# Patient Record
Sex: FEMALE | Race: WHITE
[De-identification: names, ages, dates, MRNs, and addresses within clinical notes are randomized per-mention and may not be internally consistent; named-entity substitution may affect disease eponyms.]

---

## 2018-04-27 ENCOUNTER — HOSPITAL ENCOUNTER (OUTPATIENT)
Dept: HOSPITAL 17 - CPRE | Age: 83
End: 2018-04-27
Attending: ORTHOPAEDIC SURGERY
Payer: COMMERCIAL

## 2018-04-27 DIAGNOSIS — M25.50: ICD-10-CM

## 2018-04-27 DIAGNOSIS — Z01.812: ICD-10-CM

## 2018-04-27 DIAGNOSIS — R94.31: ICD-10-CM

## 2018-04-27 DIAGNOSIS — M16.11: ICD-10-CM

## 2018-04-27 DIAGNOSIS — Z01.818: ICD-10-CM

## 2018-04-27 DIAGNOSIS — Z01.810: Primary | ICD-10-CM

## 2018-04-27 DIAGNOSIS — Z96.60: ICD-10-CM

## 2018-04-27 DIAGNOSIS — Z79.01: ICD-10-CM

## 2018-04-27 LAB
ALBUMIN SERPL-MCNC: 3.4 GM/DL (ref 3.4–5)
ALP SERPL-CCNC: 78 U/L (ref 45–117)
ALT SERPL-CCNC: 17 U/L (ref 10–53)
AST SERPL-CCNC: 16 U/L (ref 15–37)
BASOPHILS # BLD AUTO: 0 TH/MM3 (ref 0–0.2)
BASOPHILS NFR BLD: 0.5 % (ref 0–2)
BILIRUB SERPL-MCNC: 0.4 MG/DL (ref 0.2–1)
BUN SERPL-MCNC: 16 MG/DL (ref 7–18)
CALCIUM SERPL-MCNC: 8.9 MG/DL (ref 8.5–10.1)
CHLORIDE SERPL-SCNC: 105 MEQ/L (ref 98–107)
COLOR UR: YELLOW
CREAT SERPL-MCNC: 0.9 MG/DL (ref 0.5–1)
EOSINOPHIL # BLD: 0.1 TH/MM3 (ref 0–0.4)
EOSINOPHIL NFR BLD: 1.3 % (ref 0–4)
ERYTHROCYTE [DISTWIDTH] IN BLOOD BY AUTOMATED COUNT: 14 % (ref 11.6–17.2)
ERYTHROCYTE [SEDIMENTATION RATE] IN BLOOD BY WESTERGREN METHOD: 10 MM/HR (ref 0–30)
GFR SERPLBLD BASED ON 1.73 SQ M-ARVRAT: 60 ML/MIN (ref 89–?)
GLUCOSE UR STRIP-MCNC: (no result) MG/DL
HCO3 BLD-SCNC: 31.9 MEQ/L (ref 21–32)
HCT VFR BLD CALC: 40 % (ref 35–46)
HGB BLD-MCNC: 13.5 GM/DL (ref 11.6–15.3)
HGB UR QL STRIP: (no result)
INR PPP: 1 RATIO
KETONES UR STRIP-MCNC: (no result) MG/DL
LYMPHOCYTES # BLD AUTO: 1.6 TH/MM3 (ref 1–4.8)
LYMPHOCYTES NFR BLD AUTO: 26.7 % (ref 9–44)
MCH RBC QN AUTO: 31.8 PG (ref 27–34)
MCHC RBC AUTO-ENTMCNC: 33.8 % (ref 32–36)
MCV RBC AUTO: 94.1 FL (ref 80–100)
MONOCYTE #: 0.5 TH/MM3 (ref 0–0.9)
MONOCYTES NFR BLD: 7.5 % (ref 0–8)
MUCOUS THREADS #/AREA URNS LPF: (no result) /LPF
NEUTROPHILS # BLD AUTO: 3.9 TH/MM3 (ref 1.8–7.7)
NEUTROPHILS NFR BLD AUTO: 64 % (ref 16–70)
NITRITE UR QL STRIP: (no result)
PLATELET # BLD: 209 TH/MM3 (ref 150–450)
PMV BLD AUTO: 8.8 FL (ref 7–11)
PROT SERPL-MCNC: 7.2 GM/DL (ref 6.4–8.2)
PROTHROMBIN TIME: 10.3 SEC (ref 9.8–11.6)
RBC # BLD AUTO: 4.25 MIL/MM3 (ref 4–5.3)
SODIUM SERPL-SCNC: 143 MEQ/L (ref 136–145)
SP GR UR STRIP: 1.02 (ref 1–1.03)
SQUAMOUS #/AREA URNS HPF: 1 /HPF (ref 0–5)
URINE LEUKOCYTE ESTERASE: (no result)
WBC # BLD AUTO: 6.1 TH/MM3 (ref 4–11)

## 2018-04-27 PROCEDURE — 80053 COMPREHEN METABOLIC PANEL: CPT

## 2018-04-27 PROCEDURE — 85652 RBC SED RATE AUTOMATED: CPT

## 2018-04-27 PROCEDURE — 85610 PROTHROMBIN TIME: CPT

## 2018-04-27 PROCEDURE — 85025 COMPLETE CBC W/AUTO DIFF WBC: CPT

## 2018-04-27 PROCEDURE — 93005 ELECTROCARDIOGRAM TRACING: CPT

## 2018-04-27 PROCEDURE — 85730 THROMBOPLASTIN TIME PARTIAL: CPT

## 2018-04-27 PROCEDURE — 36415 COLL VENOUS BLD VENIPUNCTURE: CPT

## 2018-04-27 PROCEDURE — 81001 URINALYSIS AUTO W/SCOPE: CPT

## 2018-04-27 PROCEDURE — 71046 X-RAY EXAM CHEST 2 VIEWS: CPT

## 2018-04-27 NOTE — EKG
Date Performed: 04/27/2018       Time Performed: 09:36:35

 

PTAGE:      83 years

 

EKG:      Sinus rhythm 

 

 MODERATE ST DEPRESSION ABNORMAL ECG 

 

NO PREVIOUS TRACING            

 

DOCTOR:   Remi Kimball  Interpretating Date/Time  04/27/2018 13:34:39

## 2018-04-27 NOTE — RADRPT
EXAM DATE/TIME:  04/27/2018 11:21 

 

[facility] COMPARISON:     

No previous studies available for comparison.

 

                     

INDICATIONS :     

Pre op right hip surgery.

                     

 

MEDICAL HISTORY :     

None.          

 

SURGICAL HISTORY :     

None.   

 

ENCOUNTER:     

Initial                                        

 

ACUITY:     

1 day      

 

PAIN SCORE:     

0/10

 

LOCATION:     

Bilateral chest 

 

FINDINGS:     

PA and lateral views of the chest demonstrate the lungs to be symmetrically aerated without evidence 
of mass, infiltrate or effusion.  The cardiomediastinal contours are unremarkable. Degenerative garcia
es are noted throughout the thoracic spine.

 

CONCLUSION:     

1. No acute cardiopulmonary disease. 

2. Degenerative changes throughout the thoracic spine. 

 

 

 Derek Tracy MD on April 27, 2018 at 11:42           

Board Certified Radiologist.

 This report was verified electronically.

## 2018-05-14 ENCOUNTER — HOSPITAL ENCOUNTER (INPATIENT)
Dept: HOSPITAL 17 - HSDI | Age: 83
LOS: 1 days | Discharge: HOME HEALTH | DRG: 470 | End: 2018-05-15
Attending: ORTHOPAEDIC SURGERY | Admitting: ORTHOPAEDIC SURGERY
Payer: MEDICARE

## 2018-05-14 VITALS
SYSTOLIC BLOOD PRESSURE: 105 MMHG | RESPIRATION RATE: 18 BRPM | TEMPERATURE: 96.7 F | OXYGEN SATURATION: 98 % | DIASTOLIC BLOOD PRESSURE: 52 MMHG | HEART RATE: 80 BPM

## 2018-05-14 VITALS
HEART RATE: 71 BPM | DIASTOLIC BLOOD PRESSURE: 57 MMHG | OXYGEN SATURATION: 99 % | SYSTOLIC BLOOD PRESSURE: 114 MMHG | RESPIRATION RATE: 16 BRPM | TEMPERATURE: 98.2 F

## 2018-05-14 VITALS
TEMPERATURE: 97.6 F | OXYGEN SATURATION: 98 % | DIASTOLIC BLOOD PRESSURE: 59 MMHG | RESPIRATION RATE: 17 BRPM | SYSTOLIC BLOOD PRESSURE: 110 MMHG | HEART RATE: 55 BPM

## 2018-05-14 VITALS
RESPIRATION RATE: 17 BRPM | HEART RATE: 54 BPM | SYSTOLIC BLOOD PRESSURE: 118 MMHG | DIASTOLIC BLOOD PRESSURE: 54 MMHG | TEMPERATURE: 97.3 F | OXYGEN SATURATION: 99 %

## 2018-05-14 VITALS — HEIGHT: 66 IN | BODY MASS INDEX: 25.79 KG/M2 | WEIGHT: 160.5 LBS

## 2018-05-14 DIAGNOSIS — E78.5: ICD-10-CM

## 2018-05-14 DIAGNOSIS — M24.851: ICD-10-CM

## 2018-05-14 DIAGNOSIS — E11.9: ICD-10-CM

## 2018-05-14 DIAGNOSIS — Z79.84: ICD-10-CM

## 2018-05-14 DIAGNOSIS — M25.751: ICD-10-CM

## 2018-05-14 DIAGNOSIS — M16.11: Primary | ICD-10-CM

## 2018-05-14 PROCEDURE — 85027 COMPLETE CBC AUTOMATED: CPT

## 2018-05-14 PROCEDURE — 86850 RBC ANTIBODY SCREEN: CPT

## 2018-05-14 PROCEDURE — 0SR902A REPLACEMENT OF RIGHT HIP JOINT WITH METAL ON POLYETHYLENE SYNTHETIC SUBSTITUTE, UNCEMENTED, OPEN APPROACH: ICD-10-PCS | Performed by: ORTHOPAEDIC SURGERY

## 2018-05-14 PROCEDURE — 86901 BLOOD TYPING SEROLOGIC RH(D): CPT

## 2018-05-14 PROCEDURE — 82948 REAGENT STRIP/BLOOD GLUCOSE: CPT

## 2018-05-14 PROCEDURE — 94150 VITAL CAPACITY TEST: CPT

## 2018-05-14 PROCEDURE — 86900 BLOOD TYPING SEROLOGIC ABO: CPT

## 2018-05-14 PROCEDURE — 80048 BASIC METABOLIC PNL TOTAL CA: CPT

## 2018-05-14 PROCEDURE — 76000 FLUOROSCOPY <1 HR PHYS/QHP: CPT

## 2018-05-14 PROCEDURE — C1776 JOINT DEVICE (IMPLANTABLE): HCPCS

## 2018-05-14 PROCEDURE — 73502 X-RAY EXAM HIP UNI 2-3 VIEWS: CPT

## 2018-05-14 RX ADMIN — PHENYTOIN SODIUM SCH MLS/HR: 50 INJECTION INTRAMUSCULAR; INTRAVENOUS at 11:50

## 2018-05-14 RX ADMIN — PHENYTOIN SODIUM SCH MLS/HR: 50 INJECTION INTRAMUSCULAR; INTRAVENOUS at 16:14

## 2018-05-14 RX ADMIN — ONDANSETRON PRN MG: 2 INJECTION, SOLUTION INTRAMUSCULAR; INTRAVENOUS at 22:27

## 2018-05-14 RX ADMIN — INSULIN ASPART SCH: 100 INJECTION, SOLUTION INTRAVENOUS; SUBCUTANEOUS at 20:21

## 2018-05-14 RX ADMIN — ONDANSETRON PRN MG: 2 INJECTION, SOLUTION INTRAMUSCULAR; INTRAVENOUS at 13:29

## 2018-05-14 RX ADMIN — HYDROCODONE BITARTRATE AND ACETAMINOPHEN PRN TAB: 10; 325 TABLET ORAL at 22:27

## 2018-05-14 RX ADMIN — INSULIN ASPART SCH: 100 INJECTION, SOLUTION INTRAVENOUS; SUBCUTANEOUS at 13:29

## 2018-05-14 RX ADMIN — INSULIN ASPART SCH: 100 INJECTION, SOLUTION INTRAVENOUS; SUBCUTANEOUS at 16:14

## 2018-05-14 NOTE — HHI.FF
Face to Face Verification


Diagnosis:  


(1) Primary localized osteoarthrosis, pelvic region and thigh


Physical Therapy


Gait training, Safety evaluation, Transfer training, bed to chair


Hip:  Total hip, Protocol: Right


Right LE Weight Bearing:  WB as tolerated





Nursing


Nursing:  Dressing changes


Dressing Changes:  Daily dressing change











I have seen patient Evita Jorge on 5/14/18. My clinical findings 

support the need for the requested home health care services because:








 Limited ability to care for self





 High risk of falls














I certify that my clinical findings support that this patient is homebound 

because:








 Post-op weakness





 Unsteady gait/balance

















Morales Egan May 14, 2018 13:09

## 2018-05-14 NOTE — MP
cc:

Morales José MD

****

 

 

DATE OF OPERATION:

05/14/2018

 

PREOPERATIVE DIAGNOSIS:

Right hip osteoarthritis.

 

POSTOPERATIVE DIAGNOSIS:

Right hip osteoarthritis.

 

PROCEDURE PERFORMED:

Right total hip arthroplasty.

 

SURGEON:

Morales José MD.

 

FIRST ASSISTANT:

MADHAVI Presley.

 

ANESTHESIA:

General.

 

ESTIMATED BLOOD LOSS:

200 mL.

 

COMPLICATIONS:

None.

 

IMPLANTS USED:

DePuy Corail size 12 press-fit standard offset femoral stem, size 52 

Ransom Gription cup, 36 mm highly cross-linked polyethylene neutral 

liner, 36 mm cobalt chrome head, +1.5 neck.

 

JUSTIFICATION:

This patient is an 83-year-old female with a history of severe 

osteoarthritis involving the right hip.  She has severe disabling pain

with standing, walking, ambulation and weight-bearing activities.  She

has severe pain at rest.  She has failed greater than 3 months of 

nonoperative conservative treatment to include medication therapy, 

injections, ambulatory assistive aids, home exercise program, activity

modification, and weight loss.  X-ray of the right hip reveal severe 

osteoarthrosis, bone-on-bone joint space narrowing, subchondral 

sclerosis, subchondral cyst osteophyte formation with subluxation.  

The patient was counseled as to the risks, benefits and alternatives 

to a total hip arthroplasty.  The risks were discussed, which include,

but are not limited to anesthesia, bleeding, infection, damage to 

nerves or blood vessels, pain, stiffness, fracture dislocations, leg 

length discrepancy, blood clots, and even death.  The patient's pain 

is severe.  She favored the benefits over the risks, she did wish to 

proceed with surgery.

 

PROCEDURE IN DETAIL:

Written consent was obtained.  The patient was identified by name, 

taken to the operating room, placed supine on the operating table.  

General anesthesia was administered, as well as 2 grams of IV Ancef, 

and 1 gram of IV vancomycin.  The right hip and right lower extremity 

were prepped and draped using Isuprel alcohol, Hibiclens solution and 

ChloraPrep solution.  After a time-out was performed, a longitudinal 

incision was made over the anterolateral aspect of the right hip.  The

fascial layer was incised.  Dissection was carried over the tensor 

fascia fernanda beneath the rectus femoris to allow exposure of the 

anterior hip capsulotomy.  A capsulotomy incision was performed.  An 

oscillating saw was used to perform a femoral neck cut, the 

osteoarthritic femoral head and neck component was removed.  A #10 

blade scalpel was used to excise the labrum.  Sequential reaming began

at size 49 and was carried through to a size 52.  Subsequently, a 

Ransom Gription cup size 52 was then placed in approximately 45 

degrees of abduction and 10 degrees of anteversion.  There was 

excellent purchase and fixation of the insertion of the cup of the 

cup.  A screw hole eliminator was placed, followed by the neutral 

polyethylene highly cross-linked 36 mm liner.  The liner was impacted 

in place and tested for stability.  Attention was turned to the femur 

where the leg was externally rotated, extended and adducted.  The 

capsule was released off the undersurface of the greater trochanter to

allow for elevation and lateralization of the femur.  A box cutting 

osteotome was used to gain entry into the intramedullary canal of the 

femur.  This was followed by canal finder, sequential broaching up to 

size 12.  A calcar planer was used to plane the calcar.  Trial head 

and neck combinations were evaluated and final components implanted 

with the current components.  The leg could achieve external rotation 

to 70 degrees and extension all the way down to the ground without 

evidence of anterior instability or impingement.  Fluoroscopic imaging

showed appropriate implantation of components and soft tissue tension 

felt appropriate.

 

The surgical wound was thoroughly irrigated with sterile saline, pulse

lavage antibiotic impregnated solution.  The fascial layer was closed 

with #1 Vicryl suture, subcuticular layer 2-0 Vicryl suture, skin was 

closed with Dermabond.  Sterile dressing applied.  The patient 

tolerated the procedure with no intraoperative complications noted.

 

Julio Egan, Physician Assistant, Certified, was present during the 

entire procedure to include patient positioning and the procedure 

itself.  The medical necessity of a physician assistant was indicated 

in this case due to the complexity of the procedure, he assisted with 

appropriate manipulation of the leg and also retraction of muscles, 

tendon, bone and neurovascular structures.  He assisted with 

appropriate manipulation of bone and also implantation of the 

prosthetic replacement.

 

 

 

 

__________________________________

MD KRISTA Kelly/ASH

D: 05/14/2018, 11:18 AM

T: 05/14/2018, 11:56 AM

Visit #: [id number]

Job #: [id number]

## 2018-05-14 NOTE — HHI.DCPOC
Discharge Care Plan


Diagnosis:  


(1) Primary localized osteoarthrosis, pelvic region and thigh








Your Health Problems Are: Difficulty with ADL








Goals to Promote Your Health


* To prevent worsening of your condition and complications


* To maintain your health at the optimal level


Directions to Meet Your Goals


*** Take your medications as prescribed


*** Follow your dietary instruction


*** Follow activity as directed








*** Keep your appointments as scheduled


*** Take your immunizations and boosters as scheduled


*** If your symptoms worsen call your PCP, if no PCP go to Urgent Care Center 

or Emergency Room***


*** Smoking is Dangerous to Your Health. Avoid second hand smoke***


***Call the 24-hour hour crisis hotline for domestic abuse at 1-529.164.7512***











Morales Egan May 14, 2018 13:08

## 2018-05-14 NOTE — RADRPT
EXAM DATE/TIME:  05/14/2018 23:44 

 

[facility] COMPARISON:     

No previous studies available for comparison.

 

                     

INDICATIONS :     

Post op right hip

                     

 

MEDICAL HISTORY :     

None.          

 

SURGICAL HISTORY :        

total right hip

 

ENCOUNTER:     

Initial                                        

 

ACUITY:     

1 day      

 

PAIN SCORE:     

Non-responsive.

 

LOCATION:     

Right  hip

 

FINDINGS:     

Examination of the right hip was performed with AP Pelvis. Postoperative right hip. Postsurgical valdes
ges. Moderate degenerative changes left hip. Osteitis pubis.

 

CONCLUSION:     Postoperative right hip

 

 

 

 Godwin Conley MD on May 14, 2018 at 12:16           

Board Certified Radiologist.

 This report was verified electronically.

## 2018-05-14 NOTE — RADRPT
EXAM DATE/TIME:  05/14/2018 10:11 

 

[facility] COMPARISON:     

HIP RIGHT (AP&LAT 2/3VWS) W AP PELVIS, May 14, 2018, 23:44.

 

                     

INDICATIONS :     

Right total hip replacement. Per Dr. José, images adequate for doctor.

                     

 

MEDICAL HISTORY :     

None.          

 

SURGICAL HISTORY :     

None.   

 

ENCOUNTER:     

Initial                                        

 

ACUITY:     

1 day      

 

PAIN SCORE:     

Non-responsive.

 

LOCATION:     

Right  Hip

 

 

CONCLUSION:     

Fluoroscopic image during right hip arthroplasty.

 

 

 

 Godwin Conley MD on May 14, 2018 at 12:25           

Board Certified Radiologist.

 This report was verified electronically.

## 2018-05-14 NOTE — PD.CONS
HPI


Service


[facility]


Consult Requested By





Reason for Consult


medical management


Primary Care Physician


Verónica Chahal DO


Diagnoses:  


History of Present Illness


patient is a 84 y/o female with history of osteoarthritis, diabetes mellitus 

and dyslipidemia who's been admitted for right total hip arthroplasty. at the 

time of my evaluation she was resting comfortably with no distress, pain or any 

other complaints.





Review of Systems


Constitutional:  DENIES: Fever, Weight loss, Chills, Night Sweats


Eyes:  DENIES: Blurred vision, Diplopia, Vision loss, Double Vision


Ears, nose, mouth, throat:  DENIES: Tinnitus, Vertigo, Throat pain, Epistaxis


Respiratory:  DENIES: Apneas, Cough, Snoring, Wheezing, Hemoptysis, Sputum 

production, Shortness of breath


Cardiovascular:  DENIES: Chest pain, Palpitations, Syncope, Dyspnea on Exertion

, PND, Lower Extremity Edema, Orthopnea, Claudication


Gastrointestinal:  DENIES: Abdominal pain, Black stools, Bloody stools, 

Constipation, Diarrhea, Nausea, Vomiting, Difficulty Swallowing, Anorexia


Genitourinary:  DENIES: Urinary frequency, Urgency, Hematuria, Dysuria


Musculoskeletal:  DENIES: Joint pain, Muscle aches, Stiffness, Joint Swelling


Integumentary:  DENIES: Rash


Neurologic:  DENIES: Abnormal gait, Headache, Localized weakness, Paresthesias, 

Seizures, Speech Problems, Tremor, Poor Balance


Psychiatric:  DENIES: Anxiety, Confusion, Mood changes, Depression, 

Hallucinations, Agitation, Suicidal Ideation, Homicidal Ideation, Delusions





Past Family Social History


Allergies:  


Coded Allergies:  


     No Known Allergies (Unverified , 4/27/18)


Past Medical History


diabetes mellitus/ dyslipidemia/osteoarthritis


Past Surgical History


tonsillectomy/ knee replacement/ hysterectomy


Reported Medications


actos/Onglyza/pravastatin/aspirin/ cholecalciferol/protonix/norco


Active Ordered Medications





Inpatient Medications


Acetaminophen/ Hydrocodone Bitart (Norco  Mg) 2 tab Q6H  PRN PO PAIN 

SCALE 5 TO 10;  Start 5/14/18 at 07:00


Bupivacaine Liposome 20 ml/ Sodium Chloride 60 ml @  120 mls/hr ONCE P-ARTICULR 

;  Start 5/14/18 at 07:45;  Stop 5/15/18 at 07:44


Cefazolin Sodium 1000 mg/Sodium Chloride 100 ml @  200 mls/hr Q6H IV ;  Start 5/ 14/18 at 07:00;  Stop 5/14/18 at 19:29;  Status UNV


Cefazolin Sodium/ Dextrose 50 ml @  100 mls/hr ON  CALL IV ;  Start 5/14/18 at 

07:45;  Stop 5/17/18 at 07:44


Chlorhexidine Gluconate (Chlorhexidine 2% Cloth) 3 pack ON CALL  PRN TOPICAL 

SEE LABEL COMMENTS;  Start 5/14/18 at 07:45;  Stop 5/17/18 at 07:44


Chlorhexidine Gluconate (Hibiclens 4% Top Soln) 1 applic ONCE TOPICAL ;  Start 5 /14/18 at 07:45;  Stop 5/17/18 at 07:44


Dexamethasone Sodium Phosphate (Decadron Inj) 10 mg ONCE  ONCE IV PUSH ;  Start 

5/14/18 at 07:45;  Stop 5/14/18 at 07:46;  Status DC


Diphenhydramine HCl (Benadryl Inj) 25 mg Q6H  PRN IV PUSH ITCHING;  Start 5/14/ 18 at 07:00;  Status UNV


Docusate Sodium (Colace) 100 mg BID PO ;  Start 5/15/18 at 21:00


Enoxaparin Sodium (Lovenox Inj) 40 mg Q24H SQ ;  Start 5/14/18 at 07:00;  Stop 5 /23/18 at 07:01;  Status UNV


Lactated Ringer's 1,000 ml @  30 mls/hr Q24H PRN IV SEE LABEL COMMENTS;  Start 5 /14/18 at 07:45;  Stop 5/17/18 at 07:44


Metoprolol Tartrate (Lopressor) 25 mg ON CALL  PRN PO SEE LABEL COMMENTS;  

Start 5/14/18 at 07:45;  Stop 5/17/18 at 07:44


Miscellaneous Information (Misc Post-op Orders (for Pharmacy))  STAT  ONCE XX ;

  Start 5/14/18 at 07:00;  Stop 5/14/18 at 07:01;  Status UNV


Morphine Sulfate (Morphine Inj) 3 mg Q3H  PRN IV PUSH Pain >7 when off PCA;  

Start 5/14/18 at 07:00


Multivitamins/ Minerals Therapeutic (Theragran M Tab) 1 tab BID PO ;  Start 5/15

/18 at 21:00;  Stop 7/14/18 at 20:59


Non-Formulary Medication 5 mg DAILY PO ;  Start 5/14/18 at 09:00;  Status UNV


Ondansetron HCl (Zofran Inj) 4 mg Q6H  PRN IVP NAUSEA OR VOMITING;  Start 5/14/ 18 at 07:00


Pantoprazole Sodium (Protonix) 40 mg DAILY PO ;  Start 5/14/18 at 09:00


Pioglitazone HCl (Actos) 15 mg DAILY PO ;  Start 5/14/18 at 09:00


Povidone Iodine (Betadine 5% Antisepsis Kit) 1 applic ON CALL  PRN EACH NARE 

SEE LABEL COMMENTS;  Start 5/14/18 at 07:45;  Stop 5/17/18 at 07:44


Povidone Iodine (Betadine 7.5% Scrub) 1 applic ONCE TOPICAL ;  Start 5/14/18 at 

07:45;  Stop 5/17/18 at 07:44


Pravastatin Sodium (Pravachol) 20 mg DAILY PO ;  Start 5/14/18 at 09:00


Sodium Chloride 500 ml @  30 mls/hr [id number] PRN IV SEE LABEL COMMENTS;  Start 5/ 14/18 at 07:45;  Stop 5/17/18 at 07:44


Tranexamic Acid 1092 mg/Sodium Chloride 110.92 ml  @ 200 mls/ hr ONCE IV ;  

Start 5/14/18 at 07:45;  Stop 5/15/18 at 07:44


Tranexamic Acid 3000 mg/Sodium Chloride 130 ml @  260 mls/hr ONCE P-ARTICULR ;  

Start 5/14/18 at 07:45;  Stop 5/15/18 at 07:44


Vancomycin HCl 1000 mg/Sodium Chloride 250 ml @  250 mls/hr ON  CALL IV ;  

Start 5/14/18 at 07:45;  Stop 5/17/18 at 07:44


Zolpidem Tartrate (Ambien) 5 mg HS  PRN PO SLEEP;  Start 5/14/18 at 07:00


Family History


not relevant to this consult.


Social History


doesn't smoke/ drinks occasionally.





Physical Exam


Vital Signs





Vital Signs








  Date Time  Temp Pulse Resp B/P (MAP) Pulse Ox O2 Delivery O2 Flow Rate FiO2


 


5/14/18 07:50 97.8 65 20 143/68 (93) 96   








Physical Exam


GENERAL: This is a well-nourished, well-developed patient, in no apparent 

distress.


SKIN: No rashes, ecchymoses or lesions. Cool and dry.


HEAD: Atraumatic. Normocephalic. No temporal or scalp tenderness.


EYES: Pupils equal round and reactive. Extraocular motions intact. No scleral 

icterus. No injection or drainage. 


ENT: Nose without bleeding, purulent drainage or septal hematoma. Throat 

without erythema, tonsillar hypertrophy or exudate. Uvula midline. Airway 

patent.


NECK: Trachea midline. No JVD or lymphadenopathy. Supple, nontender, no 

meningeal signs.


CARDIOVASCULAR: Regular rate and rhythm without murmurs, gallops, or rubs. 


RESPIRATORY: Clear to auscultation. Breath sounds equal bilaterally. No wheezes

, rales, or rhonchi.  


GASTROINTESTINAL: Abdomen soft, non-tender, nondistended. No hepato-splenomegaly

, or palpable masses. No guarding.


MUSCULOSKELETAL: Extremities without clubbing, cyanosis, or edema. No joint 

tenderness, effusion, or edema noted. No calf tenderness. Negative Homans sign 

bilaterally.


NEUROLOGICAL: Awake and alert. Cranial nerves II through XII intact.  Motor and 

sensory grossly within normal limits. Five out of 5 muscle strength in all 

muscle groups.  Normal speech.





Assessment and Plan


Assessment and Plan


A/P  





- osteoarthritis- plan for right total hip arthroplasty


  management per ortho.


-diabetes mellitus; resumed home meds- accu-check with SSI


-dyslipidemia; resumed statin


-DVT prophylaxis- per ortho








thank you for the consult.


Discussed Condition With


the patient.











Soni Mccrary MD May 14, 2018 08:27

## 2018-05-15 VITALS
SYSTOLIC BLOOD PRESSURE: 101 MMHG | RESPIRATION RATE: 16 BRPM | HEART RATE: 60 BPM | DIASTOLIC BLOOD PRESSURE: 53 MMHG | TEMPERATURE: 97.5 F | OXYGEN SATURATION: 99 %

## 2018-05-15 VITALS
RESPIRATION RATE: 17 BRPM | OXYGEN SATURATION: 97 % | SYSTOLIC BLOOD PRESSURE: 92 MMHG | DIASTOLIC BLOOD PRESSURE: 46 MMHG | HEART RATE: 61 BPM | TEMPERATURE: 97.5 F

## 2018-05-15 VITALS — DIASTOLIC BLOOD PRESSURE: 58 MMHG | SYSTOLIC BLOOD PRESSURE: 107 MMHG

## 2018-05-15 VITALS
DIASTOLIC BLOOD PRESSURE: 52 MMHG | SYSTOLIC BLOOD PRESSURE: 94 MMHG | HEART RATE: 62 BPM | RESPIRATION RATE: 16 BRPM | TEMPERATURE: 97.4 F | OXYGEN SATURATION: 99 %

## 2018-05-15 LAB
BUN SERPL-MCNC: 13 MG/DL (ref 7–18)
CALCIUM SERPL-MCNC: 7.9 MG/DL (ref 8.5–10.1)
CHLORIDE SERPL-SCNC: 107 MEQ/L (ref 98–107)
CREAT SERPL-MCNC: 0.73 MG/DL (ref 0.5–1)
ERYTHROCYTE [DISTWIDTH] IN BLOOD BY AUTOMATED COUNT: 14 % (ref 11.6–17.2)
GFR SERPLBLD BASED ON 1.73 SQ M-ARVRAT: 77 ML/MIN (ref 89–?)
GLUCOSE SERPL-MCNC: 153 MG/DL (ref 74–106)
HCO3 BLD-SCNC: 27.4 MEQ/L (ref 21–32)
HCT VFR BLD CALC: 32.6 % (ref 35–46)
HGB BLD-MCNC: 10.9 GM/DL (ref 11.6–15.3)
MCH RBC QN AUTO: 31.4 PG (ref 27–34)
MCHC RBC AUTO-ENTMCNC: 33.5 % (ref 32–36)
MCV RBC AUTO: 93.7 FL (ref 80–100)
PLATELET # BLD: 181 TH/MM3 (ref 150–450)
PMV BLD AUTO: 8.9 FL (ref 7–11)
RBC # BLD AUTO: 3.48 MIL/MM3 (ref 4–5.3)
SODIUM SERPL-SCNC: 143 MEQ/L (ref 136–145)
WBC # BLD AUTO: 13.2 TH/MM3 (ref 4–11)

## 2018-05-15 RX ADMIN — PHENYTOIN SODIUM SCH MLS/HR: 50 INJECTION INTRAMUSCULAR; INTRAVENOUS at 13:00

## 2018-05-15 RX ADMIN — INSULIN ASPART SCH: 100 INJECTION, SOLUTION INTRAVENOUS; SUBCUTANEOUS at 11:47

## 2018-05-15 RX ADMIN — HYDROCODONE BITARTRATE AND ACETAMINOPHEN PRN TAB: 10; 325 TABLET ORAL at 12:28

## 2018-05-15 RX ADMIN — INSULIN ASPART SCH: 100 INJECTION, SOLUTION INTRAVENOUS; SUBCUTANEOUS at 08:00

## 2018-05-15 RX ADMIN — HYDROCODONE BITARTRATE AND ACETAMINOPHEN PRN TAB: 10; 325 TABLET ORAL at 08:07

## 2018-05-15 RX ADMIN — PHENYTOIN SODIUM SCH MLS/HR: 50 INJECTION INTRAMUSCULAR; INTRAVENOUS at 03:00

## 2018-05-15 NOTE — HHI.PR
Subjective


Remarks


Patient sitting up in chair, just finished physical therapy. Denies any pain. 

No chest pain or SOB





Objective


Vitals





Vital Signs








  Date Time  Temp Pulse Resp B/P (MAP) Pulse Ox O2 Delivery O2 Flow Rate FiO2


 


5/15/18 08:00 97.5 60 16 101/53 (69) 99   


 


5/15/18 05:16    107/58 (74)    


 


5/15/18 04:30 97.5 61 17 92/46 (61) 97   


 


5/14/18 22:53 96.7 80 18 105/52 (69) 98   


 


5/14/18 19:41 97.6 55 17 110/59 (76) 98   


 


5/14/18 16:21 98.2 71 16 114/57 (76) 99   


 


5/14/18 13:05 97.3 54 17 118/54 (75) 99   


 


5/14/18 12:47 98.5 55 17 121/57 (78) 99 Nasal Cannula 3 


 


5/14/18 12:30  54 17 120/58 (78) 99 Nasal Cannula 3 


 


5/14/18 12:15  54 16 134/58 (83) 99 Nasal Cannula 3 


 


5/14/18 12:00  59 17 111/54 (73) 99 Nasal Cannula 3 


 


5/14/18 11:45  69 17 118/58 (78) 99 Nasal Cannula 3 


 


5/14/18 11:30 97.5 94 17 146/67 (93) 99 Nasal Cannula 3 














I/O      


 


 5/14/18 5/14/18 5/14/18 5/15/18 5/15/18 5/15/18





 06:59 14:59 22:59 06:59 14:59 22:59


 


Intake Total  1000 ml 480 ml 680 ml  


 


Output Total  200 ml 100 ml   


 


Balance  800 ml 380 ml 680 ml  


 


      


 


Intake Oral   480 ml 480 ml  


 


IV Total    200 ml  


 


Other  1000 ml    


 


Output Emesis   100 ml   


 


Estimated Blood Loss  200 ml    


 


# Voids   2 3  


 


# Bowel Movements   0 1  








Result Diagram:  


5/15/18 0415                                                                   

             5/15/18 0515





Objective Remarks





SKIN: Warm and dry. dressing dry and intact


HEAD: Atraumatic. Normocephalic. 


EYES: Pupils equal and round.


CARDIOVASCULAR: Regular rate and rhythm.  


RESPIRATORY: No accessory muscle use. Clear to auscultation. Breath sounds 

equal bilaterally. 


GASTROINTESTINAL: Abdomen soft, non-tender, nondistended. Hepatic and splenic 

margins not palpable. 


MUSCULOSKELETAL: Extremities without clubbing, cyanosis, or edema. No obvious 

deformities. 


NEUROLOGICAL: Awake and alert.  Normal speech.


PSYCHIATRIC: Appropriate mood and affect; insight and judgment normal.





A/P


Assessment and Plan


osteoarthritis- s/p  right total hip arthroplasty


  management per ortho.





diabetes mellitus; resumed home meds- accu-check with SSI





dyslipidemia: Cont statin





DVT prophylaxis- per ortho





Hospitalist clear for discharge


Discharge Planning


per ortho











Alecia Rocha May 15, 2018 11:18

## 2018-05-15 NOTE — PD.ORT.PN
Subjective


Post Op Day #:  1


Subjective Remarks


pain under control.





Objective


Vitals





Vital Signs








  Date Time  Temp Pulse Resp B/P (MAP) Pulse Ox O2 Delivery O2 Flow Rate FiO2


 


5/15/18 05:16    107/58 (74)    


 


5/15/18 04:30 97.5 61 17 92/46 (61) 97   


 


5/14/18 22:53 96.7 80 18 105/52 (69) 98   


 


5/14/18 19:41 97.6 55 17 110/59 (76) 98   


 


5/14/18 16:21 98.2 71 16 114/57 (76) 99   


 


5/14/18 13:05 97.3 54 17 118/54 (75) 99   


 


5/14/18 12:47 98.5 55 17 121/57 (78) 99 Nasal Cannula 3 


 


5/14/18 12:30  54 17 120/58 (78) 99 Nasal Cannula 3 


 


5/14/18 12:15  54 16 134/58 (83) 99 Nasal Cannula 3 


 


5/14/18 12:00  59 17 111/54 (73) 99 Nasal Cannula 3 


 


5/14/18 11:45  69 17 118/58 (78) 99 Nasal Cannula 3 


 


5/14/18 11:30 97.5 94 17 146/67 (93) 99 Nasal Cannula 3 














I/O      


 


 5/14/18 5/14/18 5/14/18 5/15/18 5/15/18 5/15/18





 07:00 15:00 23:00 07:00 15:00 23:00


 


Intake Total  1000 ml 580 ml 580 ml  


 


Output Total  200 ml 100 ml   


 


Balance  800 ml 480 ml 580 ml  


 


      


 


Intake Oral   480 ml 480 ml  


 


IV Total   100 ml 100 ml  


 


Other  1000 ml    


 


Output Emesis   100 ml   


 


Estimated Blood Loss  200 ml    


 


# Voids   2 3  


 


# Bowel Movements   0 1  








Result Diagram:  


5/15/18 0415                                                                   

             5/15/18 0515





Objective Remarks


in bed, nad


dressing c/d/i


neg Homans


nvi


thigh soft





Assessment & Plan


Ortho Post Op Day #:  1


Problem List:  


Assessment and Plan


s/p R JUSTINA - anterior approach


wbat


ok to maintain dressing unless saturated


lovenox - d/c on asa 81


d/c planning home with [facility]hc and pt - cleared today if does well in PT


f/up dr. cox 2 weeks











Morales Egan May 15, 2018 08:13